# Patient Record
Sex: FEMALE | Race: WHITE | NOT HISPANIC OR LATINO | Employment: OTHER | URBAN - METROPOLITAN AREA
[De-identification: names, ages, dates, MRNs, and addresses within clinical notes are randomized per-mention and may not be internally consistent; named-entity substitution may affect disease eponyms.]

---

## 2024-06-12 ENCOUNTER — HOSPITAL ENCOUNTER (OUTPATIENT)
Dept: RADIOLOGY | Facility: HOSPITAL | Age: 74
Discharge: HOME | End: 2024-06-12
Attending: STUDENT IN AN ORGANIZED HEALTH CARE EDUCATION/TRAINING PROGRAM
Payer: MEDICARE

## 2024-06-12 ENCOUNTER — OFFICE VISIT (OUTPATIENT)
Dept: ORTHOPEDICS | Facility: CLINIC | Age: 74
End: 2024-06-12
Payer: MEDICARE

## 2024-06-12 DIAGNOSIS — M79.642 LEFT HAND PAIN: ICD-10-CM

## 2024-06-12 DIAGNOSIS — M79.645 FINGER PAIN, LEFT: ICD-10-CM

## 2024-06-12 DIAGNOSIS — M79.645 FINGER PAIN, LEFT: Primary | ICD-10-CM

## 2024-06-12 DIAGNOSIS — M79.642 LEFT HAND PAIN: Primary | ICD-10-CM

## 2024-06-12 DIAGNOSIS — S62.615A CLOSED DISPLACED FRACTURE OF PROXIMAL PHALANX OF LEFT RING FINGER, INITIAL ENCOUNTER: Primary | ICD-10-CM

## 2024-06-12 PROCEDURE — 97760 ORTHOTIC MGMT&TRAING 1ST ENC: CPT | Mod: S$PBB,GP,, | Performed by: STUDENT IN AN ORGANIZED HEALTH CARE EDUCATION/TRAINING PROGRAM

## 2024-06-12 PROCEDURE — 99203 OFFICE O/P NEW LOW 30 MIN: CPT | Mod: S$PBB,,, | Performed by: STUDENT IN AN ORGANIZED HEALTH CARE EDUCATION/TRAINING PROGRAM

## 2024-06-12 PROCEDURE — 99202 OFFICE O/P NEW SF 15 MIN: CPT | Mod: PBBFAC,25 | Performed by: STUDENT IN AN ORGANIZED HEALTH CARE EDUCATION/TRAINING PROGRAM

## 2024-06-12 PROCEDURE — 99999 PR PBB SHADOW E&M-NEW PATIENT-LVL II: CPT | Mod: PBBFAC,,, | Performed by: STUDENT IN AN ORGANIZED HEALTH CARE EDUCATION/TRAINING PROGRAM

## 2024-06-12 PROCEDURE — 73130 X-RAY EXAM OF HAND: CPT | Mod: TC,LT

## 2024-06-12 PROCEDURE — 73140 X-RAY EXAM OF FINGER(S): CPT | Mod: TC,LT

## 2024-06-12 RX ORDER — APIXABAN 5 MG/1
5 TABLET, FILM COATED ORAL 2 TIMES DAILY
COMMUNITY
Start: 2024-05-17

## 2024-06-12 RX ORDER — LEVOTHYROXINE SODIUM 50 UG/1
50 TABLET ORAL
COMMUNITY
Start: 2024-05-17

## 2024-06-12 RX ORDER — PRIMIDONE 250 MG/1
250 TABLET ORAL
COMMUNITY
Start: 2024-05-17

## 2024-06-12 RX ORDER — TRAZODONE HYDROCHLORIDE 50 MG/1
50 TABLET ORAL NIGHTLY
COMMUNITY
Start: 2024-05-17

## 2024-06-12 RX ORDER — CHOLECALCIFEROL (VITAMIN D3) 25 MCG
1000 TABLET ORAL DAILY
COMMUNITY

## 2024-06-12 RX ORDER — PANTOPRAZOLE SODIUM 40 MG/1
40 TABLET, DELAYED RELEASE ORAL
COMMUNITY
Start: 2024-05-17

## 2024-06-12 RX ORDER — ESCITALOPRAM OXALATE 10 MG/1
10 TABLET ORAL
COMMUNITY
Start: 2024-05-17

## 2024-06-12 RX ORDER — UBIDECARENONE 75 MG
500 CAPSULE ORAL DAILY
COMMUNITY

## 2024-06-12 NOTE — PROGRESS NOTES
Hand Surgery Clinic Note    Chief Complaint  Chief Complaint   Patient presents with    Left Hand - Pain, Swelling, Injury       History of Present Illness  73-year-old right-hand dominant female who is retired presents for evaluation of a left ring finger injury.  On 06/06/2024, 6 days ago, patient passed out.  She has issues with low blood sugar and this has happened a few times.  After that incident, she noticed that the finger became painful and swollen with a associated ecchymosis.  Patient went to an urgent care the following day where she was diagnosed with a left ring finger proximal phalanx fracture and placed in Alumafoam splint.  Patient has been self treating with icing, elevating, and tramadol.  Of note she has a history of essential tremors as well as atrial fibrillation for which she takes Eliquis.    Review of Systems  Review of systems negative for chest pain, shortness of breath, fevers, chills, nausea/vomiting.    Past Medical History  History reviewed. No pertinent past medical history.    Past Surgical History  History reviewed. No pertinent surgical history.    Allergies  Review of patient's allergies indicates:  Not on File    Family History  No family history on file.    Social History  Social History     Socioeconomic History    Marital status: Unknown       Vital Signs  There were no vitals filed for this visit.    Physical Exam  Constitutional: Appears well-developed and well-nourished. No distress.   HENT:   Head: Normocephalic.   Eyes: EOM are normal.   Pulmonary/Chest: Effort normal.   Neurological: Oriented to person, place, and time.   Psychiatric: Normal mood and affect.     Left Upper Extremity:  There is mild to moderate swelling circumferentially about the ring finger with a associated ecchymosis.  No abrasions, lacerations, wounds.  Patient was noted to have tenderness at the proximal phalanx fracture site.  Patient was able to flex to 30° at the MCP joint.  She was able to  demonstrate gentle active PIP and DIP flexion and extension.  No malalignment clinically.  The ring finger is warm with brisk capillary refill.  Two-point discrimination is 5 mm in the radial and ulnar aspects of the ring finger.  Palpable radial pulse.    Imaging  Left ring finger x-rays three views were obtained today and independently reviewed by myself.  There is noted to be an extra-articular left ring finger proximal phalanx base fracture.  It is aligned on the AP view with slight extension on the lateral view.    Assessment and Plan  73-year-old right-hand dominant female presents with a left ring finger proximal phalanx fracture after a fall 6 days ago.  I had a long discussion with the patient and her daughter regarding treatment options.  I discussed that this fracture can be treated either operatively or nonoperatively.  I have recommended nonoperative treatment due to concern that patient was often develop issues with stiffness with surgery which could potentially make them worse even though the bone it was better aligned.  Patient was family agreed to proceed with nonoperative treatment.  Patient was placed in an ulnar gutter brace today.  Discussed that she should remove it a few times per day to work on gentle range-of-motion exercises.  Nonweightbearing left upper extremity.  Over-the-counter medications as needed for pain control.  Follow up in 2 weeks for re-evaluation with repeat x-rays of the left ring finger.    At least 10 minutes were spent sizing, fitting, and educating for durable medical equipment application today.  This service was performed under the direction of Adriana Castillo MD.  CPT 12781.     Adriana Castillo MD  Orthopaedic Hand Surgery

## 2024-06-12 NOTE — PROCEDURES
Injection right wrist for dorsal occult ganglion cyst: R intercarpal    Date/Time: 6/12/2024 9:20 AM    Performed by: Adriana Castillo MD  Authorized by: Adriana Castillo MD    Consent Done?:  Yes (Verbal)  Indications:  Pain  Timeout: Prior to procedure the correct patient, procedure, and site was verified      Location:  Wrist  Site:  R intercarpal  Ultrasonic Guidance for needle placement: No  Needle size:  25 G  Approach:  Dorsal  Medications:  40 mg triamcinolone acetonide 40 mg/mL  Patient tolerance:  Patient tolerated the procedure well with no immediate complications

## 2024-06-26 ENCOUNTER — HOSPITAL ENCOUNTER (OUTPATIENT)
Dept: RADIOLOGY | Facility: HOSPITAL | Age: 74
Discharge: HOME | End: 2024-06-26
Attending: STUDENT IN AN ORGANIZED HEALTH CARE EDUCATION/TRAINING PROGRAM
Payer: MEDICARE

## 2024-06-26 ENCOUNTER — OFFICE VISIT (OUTPATIENT)
Dept: ORTHOPEDICS | Facility: CLINIC | Age: 74
End: 2024-06-26
Payer: MEDICARE

## 2024-06-26 DIAGNOSIS — M79.645 FINGER PAIN, LEFT: ICD-10-CM

## 2024-06-26 DIAGNOSIS — S62.615D CLOSED DISPLACED FRACTURE OF PROXIMAL PHALANX OF LEFT RING FINGER WITH ROUTINE HEALING, SUBSEQUENT ENCOUNTER: Primary | ICD-10-CM

## 2024-06-26 PROCEDURE — 99212 OFFICE O/P EST SF 10 MIN: CPT | Mod: PBBFAC,25 | Performed by: STUDENT IN AN ORGANIZED HEALTH CARE EDUCATION/TRAINING PROGRAM

## 2024-06-26 PROCEDURE — 73140 X-RAY EXAM OF FINGER(S): CPT | Mod: 26,LT,, | Performed by: RADIOLOGY

## 2024-06-26 PROCEDURE — 73140 X-RAY EXAM OF FINGER(S): CPT | Mod: TC,LT

## 2024-06-26 PROCEDURE — 99214 OFFICE O/P EST MOD 30 MIN: CPT | Mod: S$PBB,,, | Performed by: STUDENT IN AN ORGANIZED HEALTH CARE EDUCATION/TRAINING PROGRAM

## 2024-06-26 PROCEDURE — 99999 PR PBB SHADOW E&M-EST. PATIENT-LVL II: CPT | Mod: PBBFAC,,, | Performed by: STUDENT IN AN ORGANIZED HEALTH CARE EDUCATION/TRAINING PROGRAM

## 2024-06-27 NOTE — PROGRESS NOTES
Hand Surgery Clinic Follow Up Note    Chief Complaint  Chief Complaint   Patient presents with    Left Hand - Pain       History of Present Illness  73-year-old right-hand dominant female who is retired presents for follow up.  Patient was sustained a left ring finger proximal phalanx fracture on 06/06/2024, 2 weeks and 6 days ago.  She was last seen for this on 06/12/2024.  After discussion with the patient, plan was to proceed proceed with nonoperative treatment.  Patient was placed in an ulnar gutter brace.  She wore it for the 1st week or so but stopped wearing it in the past week as she was not having much pain in her finger and found that the brace was cumbersome.  Pain level is a 2/10.  She feels her pain is improving. Of note she has a history of essential tremors as well as atrial fibrillation for which she takes Eliquis.     Review of Systems  Review of systems negative for chest pain, shortness of breath, fevers, chills, nausea/vomiting.    Vital Signs  There were no vitals filed for this visit.    Physical Exam  Constitutional: Appears well-developed and well-nourished. No distress.   HENT:   Head: Normocephalic.   Eyes: EOM are normal.   Pulmonary/Chest: Effort normal.   Neurological: Oriented to person, place, and time.   Psychiatric: Normal mood and affect.     Left Upper Extremity:  Minimal swelling noted at the ring finger.  No ecchymosis.  No abrasions.  No lacerations, wounds.  No tenderness of the fracture site.  Patient was noted to have an extension deformity at the fracture site.  Active ring finger MCP motion is 0-60 degrees.  Patient has full active flexion and extension at the ring finger PIPJ and DIPJ joints.  No malrotation when patient attempts to make a fist.  The ring finger is warm with brisk capillary refill.  Two-point discrimination is 5 mm in the radial and ulnar aspects of the ring finger.  Palpable radial pulse.    Imaging  Left ring finger x-rays three views were obtained today  and independently reviewed by myself.  There is noted to be an extra-articular left ring finger proximal phalanx base fracture.  It is in increased extension on the lateral view today compared to previous set of x-rays 2 weeks ago.    Assessment and Plan  73-year-old female presents for follow up.  She sustained a left ring finger proximal phalanx fracture 2 weeks and 6 days ago.  She was doing well.  She does have some displacement of the fracture on x-ray which I discussed with the patient and her daughter.  Patient has minimal pain at this point in his very happy with her motion in her outcome.  She is no longer wearing her brace because she does not feel that she needs it.  Discussed that she should continue to work on range of motion exercises.  I buddy taped the middle and ring fingers together and discussed that she can continue this for the next 2-3 weeks.  Okay to discontinue the ulnar gutter brace at this time.  She should still continue to limit weight-bearing to less than 2 lb for the next 3 weeks.  After that she can weightbear as tolerated.  Discussed follow up in 3 weeks with repeat x-rays of the left ring finger.  Patient [place] hours away.  She would prefer to simply follow up as needed if she has an issue down the road.  I gave her my contact card in case she needs to reach out to the office.    Adriana Castillo MD  Orthopaedic Hand Surgery